# Patient Record
Sex: MALE | Race: WHITE | NOT HISPANIC OR LATINO
[De-identification: names, ages, dates, MRNs, and addresses within clinical notes are randomized per-mention and may not be internally consistent; named-entity substitution may affect disease eponyms.]

---
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
## 2021-02-04 ENCOUNTER — TRANSCRIPTION ENCOUNTER (OUTPATIENT)
Age: 21
End: 2021-02-04

## 2021-02-04 RX ORDER — LEVETIRACETAM 750 MG/1
750 TABLET, EXTENDED RELEASE ORAL
Qty: 90 | Refills: 1 | Status: DISCONTINUED | COMMUNITY
Start: 2017-08-24 | End: 2021-02-04

## 2021-02-17 ENCOUNTER — APPOINTMENT (OUTPATIENT)
Dept: NEUROLOGY | Facility: CLINIC | Age: 21
End: 2021-02-17
Payer: COMMERCIAL

## 2021-02-17 VITALS
DIASTOLIC BLOOD PRESSURE: 74 MMHG | HEIGHT: 68.11 IN | HEART RATE: 58 BPM | BODY MASS INDEX: 21.67 KG/M2 | WEIGHT: 143 LBS | SYSTOLIC BLOOD PRESSURE: 116 MMHG

## 2021-02-17 PROCEDURE — 99072 ADDL SUPL MATRL&STAF TM PHE: CPT

## 2021-02-17 PROCEDURE — 99205 OFFICE O/P NEW HI 60 MIN: CPT

## 2021-02-17 NOTE — ASSESSMENT
[FreeTextEntry1] : We had a long discussion regarding the alternatives for continuing therapy with both Keppra and Aptiom versus removing Keppra and only monotherapy with Aptiom.  The family and the patient are very reluctant to try this since in the past he failed monotherapy.  He also has no side effects and therefore he is a little bit hesitant to come off the Keppra.\par \par Although I agree that in theory monotherapy would be desirable it does not seem to be an attractive alternative to the patient and family.  Therefore we will continue for the time being on the current treatment which seems to be working very well.\par \par I told the family and Nino that at any point in time he is able to reconsider this decision and come off the Keppra if he wishes to.  We certainly discussed the pros and cons of doing this and at the possibilities of remaining seizure-free on only one drug.\par \par I will see him back in 1 year with an EEG

## 2021-02-17 NOTE — PHYSICAL EXAM
[Person] : oriented to person [Place] : oriented to place [Time] : oriented to time [Short Term Intact] : short term memory intact [Span Intact] : the attention span was normal [Concentration Intact] : normal concentrating ability [Visual Intact] : visual attention was ~T not ~L decreased [Naming Objects] : no difficulty naming common objects [Repeating Phrases] : no difficulty repeating a phrase [Writing A Sentence] : no difficulty writing a sentence [Fluency] : fluency intact [Comprehension] : comprehension intact [Reading] : reading intact [Past History] : adequate knowledge of personal past history [Cranial Nerves Optic (II)] : visual acuity intact bilaterally,  visual fields full to confrontation, pupils equal round and reactive to light [Cranial Nerves Oculomotor (III)] : extraocular motion intact [Cranial Nerves Trigeminal (V)] : facial sensation intact symmetrically [Cranial Nerves Facial (VII)] : face symmetrical [Cranial Nerves Vestibulocochlear (VIII)] : hearing was intact bilaterally [Motor Tone] : muscle tone was normal in all four extremities [Motor Strength] : muscle strength was normal in all four extremities [No Muscle Atrophy] : normal bulk in all four extremities [Motor Handedness Right-Handed] : the patient is right hand dominant [Sensation Tactile Decrease] : light touch was intact [Abnormal Walk] : normal gait [Balance] : balance was intact [2+] : Ankle jerk left 2+ [Past-pointing] : there was no past-pointing [Tremor] : no tremor present [Plantar Reflex Right Only] : normal on the right [Plantar Reflex Left Only] : normal on the left

## 2021-02-17 NOTE — DISCUSSION/SUMMARY
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  Genetic studies are also unrevealing

## 2021-02-17 NOTE — HISTORY OF PRESENT ILLNESS
[FreeTextEntry1] : First visit of this [age]-year-old right-handed young man from [place] who is consulting for a history of epilepsy and management\par \par The patient past medical history and seizure history has been documented in the past.\par \par Summary \par Anish  was admitted to [facility] on 8/20/2015. He was at home working on a science project at home that involved connecting electrical wires. He walked down the stairs to tell his mom about the project, but he could not speak any words when he opened his mouth. His jaw was jerking in random directions and he could make some sounds, but no words. He was able to follow all directions such as moving his fingers, pointing, and sitting on the couch, but he was unable to read any \par words. The only words he could say were "I'm ok" and the name of one of his 3 brothers. The week before this event happened he was hit with a baseball in his left jaw (8/14/15). The same day he started to have a gastroenteritis with green colored diarrhea 5-7x a day (one bloody episode), a fever of 102, and back pain.This aphasic episode had happened once before this year on July 11th after he \par read many chapters of biblical verses aloud in summer [place]. The event lasted 10 minutes. Generalized tonic clonic seizure while being evaluated by neurology. Self resolved in <2 minutes. Post seizure, symptoms improved. CT/MRI/LP within [place] limits. [place]. Received 1 dose of Keppra. Pt was admitted for VEEG and observation. EEG and MRI both normal. Keppra was continued.\par \par March 2016\par \par He was event free till last month when he was at the [place] with his family. Anish could not speak very well and was very quiet, his right jaw and face were quivering. He then started to have right neck muscle twitching. There was jacksonian march noted down the right arm and then leg. After Ativan he started talking but was off balance/ blurred vision, had emesis etc.\par \par \par 6/20/16\par \par Had a seizure on May 29, 2016 - in high school in [facility] - was reading - felt his jaw involuntary moving on the right. Took his emergency clonazepam - has a piece of paper that says having a seizure call 911 - school called 911 - seizure didn't progress but couldn't speak so transported to hospital given Ativan - speech returned - all total lasted 30+ minutes. Had an MRI recently at [facility] - mom brought disc, reviewed with neuroradiology no suspicious areas in left temporal region. Trigger again was reading / reciting [employer] texts. He could not get an EEG at the ER in [place] where he was brought in aphasic.\par No headaches. \par \par 8/2016\wilman Oconnell underwent video EEG at [facility] with holding of the [facility]. 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 He was started on OXC as he developed fatigue on LEV limiting dose escalation. So far tolerating well and no further events. Mother has obtained a GPS watch for him and requested a letter describing his diagnosis as he was not given Ativan for a long time in outside ER, reportedly thought aphasia was migraine or post ictal. \par \par 8/2017\par Anish is now on LEV + OXC but much less tired since dose of LEV was lowered. He had been seizure free hence I tried weaning him off the LEV. He had some sensory auras ( that he can not well describe) when LEV dose was dropped to 500 mg. He did not report this to anyone. He stopped LEV and within 48 hours he had right jaw twitch and difficulty reading following the sensation. He was given Versed and episode did not progress. He had three more episodes 2-3 hours after OXC despite increasing the dose of OXC hence two days ago his LEV was restarted at 500 mg. No further seizures since.\par \par Since 2017 on Aptiom and LEV and doing well. Recent visit with Dr. Gonzalez who suggested to maybe consider removing the Keppra since he has been seizure-free and aura free for many years and his Keppra level is extremely low.\par \par He denies any auras or seizures over the last 3 years.  He denies any side effects from the medication.\par \par I reviewed the history again with the mother.  There is no family history of epilepsy or developmental disorders.  There is no family history of neurological problems.\par He is attending [employer] now and doing very well.\par

## 2021-05-03 ENCOUNTER — RX RENEWAL (OUTPATIENT)
Age: 21
End: 2021-05-03

## 2021-08-04 ENCOUNTER — RX RENEWAL (OUTPATIENT)
Age: 21
End: 2021-08-04

## 2021-08-18 ENCOUNTER — NON-APPOINTMENT (OUTPATIENT)
Age: 21
End: 2021-08-18

## 2021-08-18 ENCOUNTER — APPOINTMENT (OUTPATIENT)
Dept: NEUROLOGY | Facility: CLINIC | Age: 21
End: 2021-08-18
Payer: COMMERCIAL

## 2021-08-18 VITALS
HEIGHT: 68 IN | HEART RATE: 66 BPM | BODY MASS INDEX: 21.22 KG/M2 | WEIGHT: 140 LBS | DIASTOLIC BLOOD PRESSURE: 77 MMHG | SYSTOLIC BLOOD PRESSURE: 119 MMHG

## 2021-08-18 PROCEDURE — 99214 OFFICE O/P EST MOD 30 MIN: CPT

## 2021-08-18 NOTE — DISCUSSION/SUMMARY
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  Genetic studies are also unrevealing\par Stable

## 2021-08-18 NOTE — HISTORY OF PRESENT ILLNESS
[FreeTextEntry1] : Follow up\par F/up on this 20-year-old right-handed young man from [place] who is consulting for a history of epilepsy and management\par \par The patient past medical history and seizure history has been documented in the past.\par \par Summary \par Anish  [place] [facility] [place]. He was at home working on a science project at home that involved connecting electrical wires. He walked down the stairs to tell his mom about the project, but he could not speak any words when he opened his mouth. His jaw was jerking in random directions and he could make some sounds, but no words. He was able to follow all directions such as moving his fingers, pointing, and sitting on the couch, but he was unable to read any \par words. The only words he could say were "I'm ok" and the name of one of his 3 brothers. The week before this event happened he was hit with a baseball in his left jaw (8/14/15). The same day he started to have a gastroenteritis with green colored diarrhea 5-7x a day (one bloody episode), a fever of 102, and back pain.This aphasic episode had happened once before this year on July 11th after he \par read many chapters of biblical verses aloud in summer [place]. The event lasted 10 minutes. Generalized tonic clonic seizure while being evaluated by neurology. Self resolved in <2 minutes. Post seizure, symptoms improved. CT/MRI/LP [place] limits. [place]. Received 1 dose of Keppra. Pt was admitted for VEEG and observation. EEG and MRI both normal. Keppra was continued.\par \par March 2016\par \par He was event free till last month when he was at [place] [employer] with his family. Anish could not speak very well and was very quiet, his right jaw and face were quivering. He then started to have right neck muscle twitching. There was jacksonian march noted down the right arm and then leg. After Ativan he started talking but was off balance/ blurred vision, had emesis etc.\par \par \par 6/20/16\par \par Had a seizure on May 29, 2016 - in high school in [facility] - was reading - felt his jaw involuntary moving on the right. Took his emergency clonazepam - has a piece of paper that says having a seizure call 911 - school called 911 - seizure didn't progress but couldn't speak so transported to hospital given Ativan - speech returned - all total lasted 30+ minutes. Had an MRI recently at [facility] - mom brought disc, reviewed with neuroradiology no suspicious areas in left temporal region. Trigger again was reading / reciting Zoroastrianism texts. He could not get an EEG at the ER in [place] where he was brought in aphasic.\par No headaches. \par \par 8/2016leah Oconnell [place] [facility] [place] of the [facility]. 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 He was started on OXC as he developed fatigue on LEV limiting dose escalation. So far tolerating well and no further events. Mother has obtained a GPS watch for him and requested a letter describing his diagnosis as he was not given Ativan for a long time in outside ER, reportedly thought aphasia was migraine or post ictal. \par \par 8/2017\par Anish is now on LEV + OXC but much less tired since dose of LEV was lowered. He had been seizure free hence I tried weaning him off the LEV. He had some sensory auras ( that he can not well describe) when LEV dose was dropped to 500 mg. He did not report this to anyone. He stopped LEV and within 48 hours he had right jaw twitch and difficulty reading following the sensation. He was given Versed and episode did not progress. He had three more episodes 2-3 hours after OXC despite increasing the dose of OXC hence two days ago his LEV was restarted at 500 mg. No further seizures since.\par \par Since 2017 on Aptiom and LEV and doing well. Recent visit with Dr. Gonzalez who suggested to maybe consider removing the Keppra since he has been seizure-free and aura free for many years and his Keppra level is extremely low.\par \par He denies any auras or seizures over the last 3 years.  He denies any side effects from the medication.\par \par I reviewed the history again with the mother.  There is no family history of epilepsy or developmental disorders.  There is no family history of neurological problems.\par He is attending [employer] now and doing very well.\par \par HPI\par No seizures reproted since last seen.\par No side effects\par Going to [employer]\par mood is good\par Wants to drive.\par \par Meds\par Keppra 500 mg\par Aptiom: 200 mg am and 1600 mg qhs \par

## 2021-11-04 RX ORDER — LEVETIRACETAM 250 MG/1
250 TABLET, FILM COATED ORAL
Qty: 30 | Refills: 0 | Status: DISCONTINUED | COMMUNITY
Start: 2021-02-04 | End: 2021-11-04

## 2022-01-31 ENCOUNTER — RX RENEWAL (OUTPATIENT)
Age: 22
End: 2022-01-31

## 2022-05-18 ENCOUNTER — APPOINTMENT (OUTPATIENT)
Dept: NEUROLOGY | Facility: CLINIC | Age: 22
End: 2022-05-18
Payer: COMMERCIAL

## 2022-05-18 VITALS
HEART RATE: 58 BPM | SYSTOLIC BLOOD PRESSURE: 122 MMHG | BODY MASS INDEX: 21.67 KG/M2 | DIASTOLIC BLOOD PRESSURE: 78 MMHG | WEIGHT: 143 LBS | HEIGHT: 68 IN

## 2022-05-18 LAB
ALBUMIN SERPL ELPH-MCNC: 5 G/DL
ALP BLD-CCNC: 62 U/L
ALT SERPL-CCNC: 14 U/L
ANION GAP SERPL CALC-SCNC: 13 MMOL/L
AST SERPL-CCNC: 18 U/L
BASOPHILS # BLD AUTO: 0.03 K/UL
BASOPHILS NFR BLD AUTO: 0.6 %
BILIRUB SERPL-MCNC: 0.4 MG/DL
BUN SERPL-MCNC: 15 MG/DL
CALCIUM SERPL-MCNC: 10 MG/DL
CHLORIDE SERPL-SCNC: 100 MMOL/L
CO2 SERPL-SCNC: 24 MMOL/L
CREAT SERPL-MCNC: 0.96 MG/DL
EGFR: 116 ML/MIN/1.73M2
EOSINOPHIL # BLD AUTO: 0.04 K/UL
EOSINOPHIL NFR BLD AUTO: 0.9 %
GLUCOSE SERPL-MCNC: 97 MG/DL
HCT VFR BLD CALC: 43.6 %
HGB BLD-MCNC: 14.8 G/DL
IMM GRANULOCYTES NFR BLD AUTO: 0.2 %
LYMPHOCYTES # BLD AUTO: 1.16 K/UL
LYMPHOCYTES NFR BLD AUTO: 24.9 %
MAN DIFF?: NORMAL
MCHC RBC-ENTMCNC: 31.7 PG
MCHC RBC-ENTMCNC: 33.9 GM/DL
MCV RBC AUTO: 93.4 FL
MONOCYTES # BLD AUTO: 0.26 K/UL
MONOCYTES NFR BLD AUTO: 5.6 %
NEUTROPHILS # BLD AUTO: 3.16 K/UL
NEUTROPHILS NFR BLD AUTO: 67.8 %
PLATELET # BLD AUTO: 217 K/UL
POTASSIUM SERPL-SCNC: 4.3 MMOL/L
PROT SERPL-MCNC: 7.7 G/DL
RBC # BLD: 4.67 M/UL
RBC # FLD: 11.6 %
SODIUM SERPL-SCNC: 138 MMOL/L
WBC # FLD AUTO: 4.66 K/UL

## 2022-05-18 PROCEDURE — 99214 OFFICE O/P EST MOD 30 MIN: CPT

## 2022-05-18 NOTE — DISCUSSION/SUMMARY
[FreeTextEntry1] : 21-year-old male with focal epilepsy.  Left temporal lobe onset.  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  Genetic studies are also unrevealing\par \par No seizures\par Mild tremor hands

## 2022-05-18 NOTE — HISTORY OF PRESENT ILLNESS
[FreeTextEntry1] : Follow up\par F/up on this 20-year-old right-handed young man from [place] who is consulting for a history of epilepsy and management\par \par The patient past medical history and seizure history has been documented in the past.\par \par Summary \par Anish  was [place] to [facility] [place]20/2015. He was at home working on a science project at home that involved connecting electrical wires. He walked down the stairs to tell his mom about the project, but he could not speak any words when he opened his mouth. His jaw was jerking in random directions and he could make some sounds, but no words. He was able to follow all directions such as moving his fingers, pointing, and sitting on the couch, but he was unable to read any \par words. The only words he could say were "I'm ok" and the name of one of his 3 brothers. The week before this event happened he was hit with a baseball in his left jaw (8/14/15). The same day he started to have a gastroenteritis with green colored diarrhea 5-7x a day (one bloody episode), a fever of 102, and back pain.This aphasic episode had happened once before this year on July 11th after he \par read many chapters of biblical verses aloud in summer [place]. The event lasted 10 minutes. Generalized tonic clonic seizure while being evaluated by neurology. Self resolved in <2 minutes. Post seizure, symptoms improved. CT/MRI/LP [place] limits. [place]. Received 1 dose of Keppra. Pt was admitted for VEEG and observation. EEG and MRI both normal. Keppra was continued.\par \par March 2016\par \par He was event free till last month when he was at the [place] with his family. Anish could not speak very well and was very quiet, his right jaw and face were quivering. He then started to have right neck muscle twitching. There was jacksonian march noted down the right arm and then leg. After Ativan he started talking but was off balance/ blurred vision, had emesis etc.\par \par \par 6/20/16\par \par Had a seizure on May 29, 2016 - in high school in [facility] - was reading - felt his jaw involuntary moving on the right. Took his emergency clonazepam - has a piece of paper that says having a seizure call 911 - school called 911 - seizure didn't progress but couldn't speak so transported to hospital given Ativan - speech returned - all total lasted 30+ minutes. Had an MRI recently at [facility] - mom brought disc, reviewed with neuroradiology no suspicious areas in left temporal region. Trigger again was reading / reciting Christian texts. He could not get an EEG at the ER in [place] where he was brought in aphasic.\par No headaches. \par \par 8/2016leah Oconnell underwent video EEG at [facility] with holding of the AED. 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 He was started on OXC as he developed fatigue on LEV limiting dose escalation. So far tolerating well and no further events. Mother has obtained a GPS watch for him and requested a letter describing his diagnosis as he was not given Ativan for a long time in outside ER, reportedly thought aphasia was migraine or post ictal. \par \par 8/2017\par Anish is now on LEV + OXC but much less tired since dose of LEV was lowered. He had been seizure free hence I tried weaning him off the LEV. He had some sensory auras ( that he can not well describe) when LEV dose was dropped to 500 mg. He did not report this to anyone. He stopped LEV and within 48 hours he had right jaw twitch and difficulty reading following the sensation. He was given Versed and episode did not progress. He had three more episodes 2-3 hours after OXC despite increasing the dose of OXC hence two days ago his LEV was restarted at 500 mg. No further seizures since.\par \par Since 2017 on Aptiom and LEV and doing well. Recent visit with Dr. Gonzalez who suggested to maybe consider removing the Keppra since he has been seizure-free and aura free for many years and his Keppra level is extremely low.\par \par He denies any auras or seizures over the last 3 years.  He denies any side effects from the medication.\par \par I reviewed the history again with the mother.  There is no family history of epilepsy or developmental disorders.  There is no family history of neurological problems.\par [facility] is [facility] and doing very well.\par \par HPI   5/18/2022\par No seizures reported since last seen.\par No side effects\par Going to [employer]\par mood is good\par Driving\par \par Meds\par Keppra 750 ER mg\par Aptiom: 200 mg am and 1600 mg qhs \par

## 2022-05-18 NOTE — PHYSICAL EXAM
[Person] : oriented to person [Place] : oriented to place [Time] : oriented to time [Short Term Intact] : short term memory intact [Span Intact] : the attention span was normal [Concentration Intact] : normal concentrating ability [Visual Intact] : visual attention was ~T not ~L decreased [Naming Objects] : no difficulty naming common objects [Repeating Phrases] : no difficulty repeating a phrase [Writing A Sentence] : no difficulty writing a sentence [Fluency] : fluency intact [Comprehension] : comprehension intact [Reading] : reading intact [Past History] : adequate knowledge of personal past history [Cranial Nerves Optic (II)] : visual acuity intact bilaterally,  visual fields full to confrontation, pupils equal round and reactive to light [Cranial Nerves Oculomotor (III)] : extraocular motion intact [Cranial Nerves Trigeminal (V)] : facial sensation intact symmetrically [Cranial Nerves Facial (VII)] : face symmetrical [Cranial Nerves Vestibulocochlear (VIII)] : hearing was intact bilaterally [Motor Tone] : muscle tone was normal in all four extremities [Motor Strength] : muscle strength was normal in all four extremities [No Muscle Atrophy] : normal bulk in all four extremities [Motor Handedness Right-Handed] : the patient is right hand dominant [Sensation Tactile Decrease] : light touch was intact [Abnormal Walk] : normal gait [Balance] : balance was intact [Past-pointing] : there was no past-pointing [Tremor] : no tremor present [2+] : Ankle jerk left 2+ [Plantar Reflex Right Only] : normal on the right [Plantar Reflex Left Only] : normal on the left

## 2022-05-18 NOTE — ASSESSMENT
[FreeTextEntry1] : DC AM dose aptiom. Continue dose of night doses and Keppra\par Labs \par RTC 1 year

## 2022-05-23 LAB — LEVETIRACETAM SERPL-MCNC: 5.8 UG/ML

## 2022-09-01 ENCOUNTER — APPOINTMENT (OUTPATIENT)
Dept: NEUROLOGY | Facility: CLINIC | Age: 22
End: 2022-09-01

## 2022-09-01 VITALS
SYSTOLIC BLOOD PRESSURE: 114 MMHG | HEART RATE: 65 BPM | WEIGHT: 143 LBS | DIASTOLIC BLOOD PRESSURE: 75 MMHG | TEMPERATURE: 97.7 F | BODY MASS INDEX: 21.67 KG/M2 | HEIGHT: 68 IN | OXYGEN SATURATION: 97 %

## 2022-09-01 PROCEDURE — 99214 OFFICE O/P EST MOD 30 MIN: CPT

## 2022-09-01 NOTE — HISTORY OF PRESENT ILLNESS
[FreeTextEntry1] : Follow up\par F/up on this 21-year-old right-handed young man from [place] who is consulting for a history of epilepsy and management\par \par The patient past medical history and seizure history has been documented in the past.\par \par Summary \par Anish  was admitted to [facility] on 8/20/2015. He was at home working on a science project at home that involved connecting electrical wires. He walked down the stairs to tell his mom about the project, but he could not speak any words when he opened his mouth. His jaw was jerking in random directions and he could make some sounds, but no words. He was able to follow all directions such as moving his fingers, pointing, and sitting on the couch, but he was unable to read any \par words. The only words he could say were "I'm ok" and the name of one of his 3 brothers. The week before this event happened he was hit with a baseball in his left jaw (8/14/15). The same day he started to have a gastroenteritis with green colored diarrhea 5-7x a day (one bloody episode), a fever of 102, and back pain.This aphasic episode had happened once before this year on July 11th after he \par read many chapters of biblical verses aloud in summer [place]. The event lasted 10 minutes. Generalized tonic clonic seizure while being evaluated by neurology. Self resolved in <2 minutes. Post seizure, symptoms improved. CT/MRI/LP within normal limits. A [place] encephalitis [place]. Received 1 dose of Keppra. Pt was admitted for VEEG and observation. EEG and MRI both normal. Keppra was continued.\par \par March 2016\par \par He was event free till last month [place]. Anish could not speak very well and was very quiet, his right jaw and face were quivering. He then started to have right neck muscle twitching. There was jacksonian march noted down the right arm and then leg. After Ativan he started talking but was off balance/ blurred vision, had emesis etc.\par \par \par 6/20/16\par \par Had a seizure on May 29, 2016 - in high school in [facility] - was reading - felt his jaw involuntary moving on the right. Took his emergency clonazepam - has a piece of paper that says having a seizure call 911 - school called 911 - seizure didn't progress but couldn't speak so transported to hospital given Ativan - speech returned - all total lasted 30+ minutes. Had an MRI recently at [facility] - mom brought disc, reviewed with neuroradiology no suspicious areas in left temporal region. Trigger again was reading / reciting Sikhism texts. He could not get an EEG at the ER in [place] where he was brought in aphasic.\par No headaches. \par \par 8/2016leah Oconnell underwent video EEG at [facility] with holding of the AED. 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 He was started on OXC as he developed fatigue on LEV limiting dose escalation. So far tolerating well and no further events. Mother has obtained a GPS watch for him and requested a letter describing his diagnosis as he was not given Ativan for a long time in outside ER, reportedly thought aphasia was migraine or post ictal. \par \par 8/2017\par Anish is now on LEV + OXC but much less tired since dose of LEV was lowered. He had been seizure free hence I tried weaning him off the LEV. He had some sensory auras ( that he can not well describe) when LEV dose was dropped to 500 mg. He did not report this to anyone. He stopped LEV and within 48 hours he had right jaw twitch and difficulty reading following the sensation. He was given Versed and episode did not progress. He had three more episodes 2-3 hours after OXC despite increasing the dose of OXC hence two days ago his LEV was restarted at 500 mg. No further seizures since.\par \par Since 2017 on Aptiom and LEV and doing well. Recent visit with Dr. Gonzalez who suggested to maybe consider removing the Keppra since he has been seizure-free and aura free for many years and his Keppra level is extremely low.\par \par He denies any auras or seizures over the last 3 years.  He denies any side effects from the medication.\par \par I reviewed the history again with the mother.  There is no family history of epilepsy or developmental disorders.  There is no family history of neurological problems.\par He is attending [facility] [employer] now and doing very well.\par \par \par \par HPI     9/1/2022\par \par Going to [facility] in summer\par Doing well\par No medical issues\par Doing well \par \par Meds\par Keppra 750 ER mg\par Aptiom: 1600 mg qhs \par \par HPI   5/18/2022\par No seizures reported since last seen.\par No side effects\par Going to [facility]\par mood is good\par Driving\par \par Meds\par Keppra 750 ER mg\par Aptiom: 1600 mg qhs \par

## 2022-09-01 NOTE — DISCUSSION/SUMMARY
[FreeTextEntry1] : 21 year-old male with focal epilepsy.  Left temporal lobe onset.  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  Genetic studies are also unrevealing\par Stable

## 2023-09-26 ENCOUNTER — APPOINTMENT (OUTPATIENT)
Dept: NEUROLOGY | Facility: CLINIC | Age: 23
End: 2023-09-26
Payer: COMMERCIAL

## 2023-09-26 VITALS
BODY MASS INDEX: 22.43 KG/M2 | SYSTOLIC BLOOD PRESSURE: 127 MMHG | HEART RATE: 60 BPM | DIASTOLIC BLOOD PRESSURE: 79 MMHG | OXYGEN SATURATION: 99 % | WEIGHT: 148 LBS | HEIGHT: 68 IN | TEMPERATURE: 98 F

## 2023-09-26 PROCEDURE — 99214 OFFICE O/P EST MOD 30 MIN: CPT

## 2023-10-23 ENCOUNTER — NON-APPOINTMENT (OUTPATIENT)
Age: 23
End: 2023-10-23

## 2023-10-24 RX ORDER — MIDAZOLAM 5 MG/.1ML
5 SPRAY NASAL
Qty: 2 | Refills: 0 | Status: ACTIVE | COMMUNITY
Start: 2020-06-12 | End: 1900-01-01

## 2023-12-01 RX ORDER — ESLICARBAZEPINE ACETATE 800 MG/1
800 TABLET ORAL
Qty: 180 | Refills: 2 | Status: ACTIVE | COMMUNITY
Start: 2019-03-14 | End: 1900-01-01

## 2023-12-12 RX ORDER — LEVETIRACETAM 750 MG/1
750 TABLET, EXTENDED RELEASE ORAL
Qty: 90 | Refills: 3 | Status: ACTIVE | COMMUNITY
Start: 2021-05-03 | End: 1900-01-01

## 2024-01-02 ENCOUNTER — RX RENEWAL (OUTPATIENT)
Age: 24
End: 2024-01-02

## 2024-02-16 ENCOUNTER — NON-APPOINTMENT (OUTPATIENT)
Age: 24
End: 2024-02-16

## 2024-04-09 ENCOUNTER — APPOINTMENT (OUTPATIENT)
Dept: NEUROLOGY | Facility: CLINIC | Age: 24
End: 2024-04-09
Payer: COMMERCIAL

## 2024-04-09 VITALS
HEIGHT: 68 IN | SYSTOLIC BLOOD PRESSURE: 134 MMHG | BODY MASS INDEX: 23.34 KG/M2 | TEMPERATURE: 98.1 F | DIASTOLIC BLOOD PRESSURE: 77 MMHG | WEIGHT: 154 LBS | OXYGEN SATURATION: 99 % | HEART RATE: 69 BPM

## 2024-04-09 DIAGNOSIS — G40.109 LOCALIZATION-RELATED (FOCAL) (PARTIAL) SYMPTOMATIC EPILEPSY AND EPILEPTIC SYNDROMES WITH SIMPLE PARTIAL SEIZURES, NOT INTRACTABLE, W/OUT STATUS EPILEPTICUS: ICD-10-CM

## 2024-04-09 PROCEDURE — G2211 COMPLEX E/M VISIT ADD ON: CPT

## 2024-04-09 PROCEDURE — 99214 OFFICE O/P EST MOD 30 MIN: CPT

## 2024-04-09 RX ORDER — LEVETIRACETAM 750 MG/1
750 TABLET, FILM COATED, EXTENDED RELEASE ORAL
Refills: 0 | Status: ACTIVE | COMMUNITY

## 2024-04-09 RX ORDER — LEVETIRACETAM 250 MG/1
250 TABLET, FILM COATED ORAL
Qty: 30 | Refills: 0 | Status: DISCONTINUED | COMMUNITY
Start: 2023-09-29 | End: 2024-04-09

## 2024-04-09 RX ORDER — ESLICARBAZEPINE ACETATE 200 MG/1
200 TABLET ORAL
Qty: 90 | Refills: 2 | Status: DISCONTINUED | COMMUNITY
Start: 2019-04-29 | End: 2024-04-09

## 2024-04-09 NOTE — HISTORY OF PRESENT ILLNESS
[FreeTextEntry1] : Follow up F/up on this 23-year-old right-handed young man from [place] who is consulting for a history of epilepsy and management  The patient past medical history and seizure history has been documented in the past.  Summary  Anish  was admitted to [facility] [place]/2015. He was at home working on a science project at home that involved connecting electrical wires. He walked down the stairs to tell his mom about the project, but he could not speak any words when he opened his mouth. His jaw was jerking in random directions and he could make some sounds, but no words. He was able to follow all directions such as moving his fingers, pointing, and sitting on the couch, but he was unable to read any  words. The only words he could say were "I'm ok" and the name of one of his 3 brothers. The week before this event happened he was hit with a baseball in his left jaw (8/14/15). The same day he started to have a gastroenteritis with green colored diarrhea 5-7x a day (one bloody episode), a fever of 102, and back pain.This aphasic episode had happened once before this year on July 11th after he  read many chapters of biblical verses aloud in summer [place]. The event lasted 10 minutes. Generalized tonic clonic seizure while being evaluated by neurology. Self resolved in <2 minutes. Post seizure, symptoms improved. CT/MRI/LP within normal limits. A [facility] encephalitis panel was sent. Received 1 dose of Keppra. Pt was admitted for VEEG and observation. EEG and MRI both normal. Keppra was continued.  March 2016  He was event free till last month when he was at the [place] with his family. Anish could not speak very well and was very quiet, his right jaw and face were quivering. He then started to have right neck muscle twitching. There was jacksonian march noted down the right arm and then leg. After Ativan he started talking but was off balance/ blurred vision, had emesis etc.   6/20/16  Had a seizure on May 29, 2016 - in high school in [facility] - was reading - felt his jaw involuntary moving on the right. Took his emergency clonazepam - has a piece of paper that says having a seizure call 911 - school called 911 - seizure didn't progress but couldn't speak so transported to hospital given Ativan - speech returned - all total lasted 30+ minutes. Had an MRI recently at [facility] - mom brought disc, reviewed with neuroradiology no suspicious areas in left temporal region. Trigger again was reading / reciting Latter-day texts. He could not get an EEG at the ER in [place] where he was brought in aphasic. No headaches.   8/2016 Anish underwent video EEG at [facility] with holding of the AED. 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 He was started on OXC as he developed fatigue on LEV limiting dose escalation. So far tolerating well and no further events. Mother has obtained a GPS watch for him and requested a letter describing his diagnosis as he was not given Ativan for a long time in outside ER, reportedly thought aphasia was migraine or post ictal.   8/2017 Anish is now on LEV + OXC but much less tired since dose of LEV was lowered. He had been seizure free hence I tried weaning him off the LEV. He had some sensory auras ( that he can not well describe) when LEV dose was dropped to 500 mg. He did not report this to anyone. He stopped LEV and within 48 hours he had right jaw twitch and difficulty reading following the sensation. He was given Versed and episode did not progress. He had three more episodes 2-3 hours after OXC despite increasing the dose of OXC hence two days ago his LEV was restarted at 500 mg. No further seizures since.  Since 2017 on Aptiom and LEV and doing well. Recent visit with Dr. Gonzalez who suggested to maybe consider removing the Keppra since he has been seizure-free and aura free for many years and his Keppra level is extremely low.  He denies any auras or seizures over the last 3 years.  He denies any side effects from the medication.  I reviewed the history again with the mother.  There is no family history of epilepsy or developmental disorders.  There is no family history of neurological problems. 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  HPI    4/9/2024  Got off the Keppra and then aura. Then started again Keppra 750 ER and had not had any other auras. No side effects No issues   9/26/23 HPI: No seizures on current meds. Has had a few auras, but all related to missing a dose of both his meds. Notes he is usually very compliant.  Meds Keppra 750 ER mg Aptiom: 1600 mg qhs  Interested in coming off of Keppra.  HPI     9/1/2022  Going to [facility] in summer Doing well No medical issues Doing well   Meds Keppra 750 ER mg Aptiom: 1600 mg qhs   HPI   5/18/2022 No seizures reported since last seen. No side effects Going to [facility] mood is good Driving  Meds Keppra 750 ER mg Aptiom: 1600 mg qhs

## 2024-04-12 LAB — LEVETIRACETAM SERPL-MCNC: 6.4 UG/ML

## 2024-04-15 LAB — OXCARBAZEPINE SERPL-MCNC: 25 UG/ML

## 2024-04-16 ENCOUNTER — APPOINTMENT (OUTPATIENT)
Dept: NEUROLOGY | Facility: CLINIC | Age: 24
End: 2024-04-16
Payer: COMMERCIAL

## 2024-04-16 PROCEDURE — 95816 EEG AWAKE AND DROWSY: CPT

## 2024-06-25 NOTE — REASON FOR VISIT
[Follow-Up Evaluation] : a follow-up evaluation for [Seizure Disorder] : seizure disorder [Patient] : patient [Mother] : mother None

## 2024-07-19 ENCOUNTER — TRANSCRIPTION ENCOUNTER (OUTPATIENT)
Age: 24
End: 2024-07-19

## 2024-07-29 ENCOUNTER — NON-APPOINTMENT (OUTPATIENT)
Age: 24
End: 2024-07-29

## 2024-07-31 DIAGNOSIS — G43.719 CHRONIC MIGRAINE W/OUT AURA, INTRACTABLE, W/OUT STATUS MIGRAINOSUS: ICD-10-CM

## 2024-07-31 RX ORDER — RIZATRIPTAN BENZOATE 10 MG/1
10 TABLET ORAL
Qty: 9 | Refills: 1 | Status: ACTIVE | COMMUNITY
Start: 2024-07-31 | End: 1900-01-01

## 2024-08-06 ENCOUNTER — TRANSCRIPTION ENCOUNTER (OUTPATIENT)
Age: 24
End: 2024-08-06

## 2024-08-13 ENCOUNTER — APPOINTMENT (OUTPATIENT)
Dept: NEUROLOGY | Facility: CLINIC | Age: 24
End: 2024-08-13
Payer: COMMERCIAL

## 2024-08-13 VITALS
HEIGHT: 69 IN | DIASTOLIC BLOOD PRESSURE: 75 MMHG | HEART RATE: 64 BPM | OXYGEN SATURATION: 99 % | TEMPERATURE: 96.4 F | WEIGHT: 141 LBS | SYSTOLIC BLOOD PRESSURE: 112 MMHG | BODY MASS INDEX: 20.88 KG/M2

## 2024-08-13 DIAGNOSIS — G40.109 LOCALIZATION-RELATED (FOCAL) (PARTIAL) SYMPTOMATIC EPILEPSY AND EPILEPTIC SYNDROMES WITH SIMPLE PARTIAL SEIZURES, NOT INTRACTABLE, W/OUT STATUS EPILEPTICUS: ICD-10-CM

## 2024-08-13 DIAGNOSIS — R79.89 OTHER SPECIFIED ABNORMAL FINDINGS OF BLOOD CHEMISTRY: ICD-10-CM

## 2024-08-13 DIAGNOSIS — G43.719 CHRONIC MIGRAINE W/OUT AURA, INTRACTABLE, W/OUT STATUS MIGRAINOSUS: ICD-10-CM

## 2024-08-13 PROCEDURE — 99215 OFFICE O/P EST HI 40 MIN: CPT

## 2024-08-13 PROCEDURE — G2211 COMPLEX E/M VISIT ADD ON: CPT | Mod: NC

## 2024-08-13 RX ORDER — METHYLPREDNISOLONE 4 MG/1
4 TABLET ORAL
Qty: 1 | Refills: 0 | Status: ACTIVE | COMMUNITY
Start: 2024-08-13 | End: 1900-01-01

## 2024-08-13 RX ORDER — ERGOCALCIFEROL 1.25 MG/1
50000 CAPSULE ORAL
Qty: 5 | Refills: 0 | Status: ACTIVE | COMMUNITY
Start: 2024-08-13 | End: 1900-01-01

## 2024-08-14 ENCOUNTER — APPOINTMENT (OUTPATIENT)
Dept: INTERNAL MEDICINE | Facility: CLINIC | Age: 24
End: 2024-08-14

## 2024-08-20 ENCOUNTER — APPOINTMENT (OUTPATIENT)
Dept: NEUROLOGY | Facility: CLINIC | Age: 24
End: 2024-08-20
Payer: COMMERCIAL

## 2024-08-20 VITALS
WEIGHT: 141 LBS | HEIGHT: 69 IN | HEART RATE: 60 BPM | OXYGEN SATURATION: 99 % | TEMPERATURE: 97.5 F | BODY MASS INDEX: 20.88 KG/M2 | SYSTOLIC BLOOD PRESSURE: 127 MMHG | DIASTOLIC BLOOD PRESSURE: 80 MMHG

## 2024-08-20 PROCEDURE — 99214 OFFICE O/P EST MOD 30 MIN: CPT

## 2024-08-20 PROCEDURE — G2211 COMPLEX E/M VISIT ADD ON: CPT | Mod: NC

## 2024-08-20 NOTE — HISTORY OF PRESENT ILLNESS
[FreeTextEntry1] : Follow up F/up on this 23-year-old right-handed young man from [place] who is consulting for a history of epilepsy and management  The patient past medical history and seizure history has been documented in the past.  Summary  Anish  was admitted to [facility] on 8/20/2015. He was at home working on a science project at home that involved connecting electrical wires. He walked down the stairs to tell his mom about the project, but he could not speak any words when he opened his mouth. His jaw was jerking in random directions and he could make some sounds, but no words. He was able to follow all directions such as moving his fingers, pointing, and sitting on the couch, but he was unable to read any  words. The only words he could say were "I'm ok" and the name of one of his 3 brothers. The week before this event happened he was hit with a baseball in his left jaw (8/14/15). The same day he started to have a gastroenteritis with green colored diarrhea 5-7x a day (one bloody episode), a fever of 102, and back pain.This aphasic episode had happened once before this year on July 11th after he  read many chapters of biblical verses aloud in [place]. The event lasted 10 minutes. Generalized tonic clonic seizure while being evaluated by neurology. Self resolved in <2 minutes. Post seizure, symptoms improved. CT/MRI/LP within normal limits. A [facility] encephalitis panel was sent. Received 1 dose of Keppra. Pt was admitted for VEEG and observation. EEG and MRI both normal. Keppra was continued.  March 2016  He was event free till last month when he was at the [place] with his family. Anish could not speak very well and was very quiet, his right jaw and face were quivering. He then started to have right neck muscle twitching. There was jacksonian march noted down the right arm and then leg. After Ativan he started talking but was off balance/ blurred vision, had emesis etc.   6/20/16  Had a seizure on May 29, 2016 - in high school in [facility] - was reading - felt his jaw involuntary moving on the right. Took his emergency clonazepam - has a piece of paper that says having a seizure call 911 - school called 911 - seizure didn't progress but couldn't speak so transported to hospital given Ativan - speech returned - all total lasted 30+ minutes. Had an MRI recently at [facility] - mom brought disc, reviewed with neuroradiology no suspicious areas in left temporal region. Trigger again was reading / reciting Sabianism texts. He could not get an EEG at the ER in [place] where he was brought in aphasic. No headaches.   8/2016 Anish underwent video EEG at [facility] with holding of the AED. 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 He was started on OXC as he developed fatigue on LEV limiting dose escalation. So far tolerating well and no further events. Mother has obtained a GPS watch for him and requested a letter describing his diagnosis as he was not given Ativan for a long time in outside ER, reportedly thought aphasia was migraine or post ictal.   8/2017 Anish is now on LEV + OXC but much less tired since dose of LEV was lowered. He had been seizure free hence I tried weaning him off the LEV. He had some sensory auras ( that he can not well describe) when LEV dose was dropped to 500 mg. He did not report this to anyone. He stopped LEV and within 48 hours he had right jaw twitch and difficulty reading following the sensation. He was given Versed and episode did not progress. He had three more episodes 2-3 hours after OXC despite increasing the dose of OXC hence two days ago his LEV was restarted at 500 mg. No further seizures since.  Since 2017 on Aptiom and LEV and doing well. Recent visit with Dr. Gonzalez who suggested to maybe consider removing the Keppra since he has been seizure-free and aura free for many years and his Keppra level is extremely low.  He denies any auras or seizures over the last 3 years.  He denies any side effects from the medication.  I reviewed the history again with the mother.  There is no family history of epilepsy or developmental disorders.  There is no family history of neurological problems. 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  HPI     8/20/24  Anish didn't respond to steroids pack. Headaches same. Mild to mod 5/10 No migraine symptoms Unable to concentrate No nausea vomiting.  He is sleeping okay.  He is getting  about 2 months.  HPI    8/13/2024  No further auras after he went back on Keppra No side effects  Main problem is Headaches x several weeks. OTC doesnt help and ergots neither.  Labs show low vitamin D levels. Vit D 26   Meds Keppra 750 ER mg Aptiom: 1600 mg qhs    HPI    4/9/2024  Got off the Keppra and then aura. Then started again Keppra 750 ER and had not had any other auras. No side effects No issues   9/26/23 HPI: No seizures on current meds. Has had a few auras, but all related to missing a dose of both his meds. Notes he is usually very compliant.  Meds Keppra 750 ER mg Aptiom: 1600 mg qhs  Interested in coming off of Keppra.  HPI     9/1/2022  Going to [facility] in summer Doing well No medical issues Doing well   Meds Keppra 750 ER mg Aptiom: 1600 mg qhs   HPI   5/18/2022 No seizures reported since last seen. No side effects Going to [facility] mood is good Driving  Meds Keppra 750 ER mg Aptiom: 1600 mg qhs

## 2024-08-20 NOTE — REVIEW OF SYSTEMS
[Feeling Tired] : feeling tired [Tension Headache] : tension-type headaches [Negative] : Respiratory [As Noted in HPI] : as noted in HPI

## 2024-08-20 NOTE — HISTORY OF PRESENT ILLNESS
[FreeTextEntry1] : Follow up F/up on this 23-year-old right-handed young man from [place] who is consulting for a history of epilepsy and management  The patient past medical history and seizure history has been documented in the past.  Summary  Anish  was admitted to [facility] on 8/20/2015. He was at home working on a science project at home that involved connecting electrical wires. He walked down the stairs to tell his mom about the project, but he could not speak any words when he opened his mouth. His jaw was jerking in random directions and he could make some sounds, but no words. He was able to follow all directions such as moving his fingers, pointing, and sitting on the couch, but he was unable to read any  words. The only words he could say were "I'm ok" and the name of one of his 3 brothers. The week before this event happened he was hit with a baseball in his left jaw (8/14/15). The same day he started to have a gastroenteritis with green colored diarrhea 5-7x a day (one bloody episode), a fever of 102, and back pain.This aphasic episode had happened once before this year on July 11th after he  read many chapters of biblical verses aloud in summer [place]. The event lasted 10 minutes. Generalized tonic clonic seizure while being evaluated by neurology. Self resolved in <2 minutes. Post seizure, symptoms improved. CT/MRI/LP within normal limits. A [facility] encephalitis panel was sent. Received 1 dose of Keppra. Pt was admitted for VEEG and observation. EEG and MRI both normal. Keppra was continued.  March 2016  He was event free till last month when he was at the [place] with his family. Anish could not speak very well and was very quiet, his right jaw and face were quivering. He then started to have right neck muscle twitching. There was jacksonian march noted down the right arm and then leg. After Ativan he started talking but was off balance/ blurred vision, had emesis etc.   6/20/16  Had a seizure on May 29, 2016 - in high school in [facility] - was reading - felt his jaw involuntary moving on the right. Took his emergency clonazepam - has a piece of paper that says having a seizure call 911 - school called 911 - seizure didn't progress but couldn't speak so transported to hospital given Ativan - speech returned - all total lasted 30+ minutes. Had an MRI recently at [facility] - mom brought disc, reviewed with neuroradiology no suspicious areas in left temporal region. Trigger again was reading / reciting Protestant texts. He could not get an EEG at the ER in [place] where he was brought in aphasic. No headaches.   8/2016 Anish underwent video EEG at [facility] with holding of the AED. 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 He was started on OXC as he developed fatigue on LEV limiting dose escalation. So far tolerating well and no further events. Mother has obtained a GPS watch for him and requested a letter describing his diagnosis as he was not given Ativan for a long time in outside ER, reportedly thought aphasia was migraine or post ictal.   8/2017 Anish is now on LEV + OXC but much less tired since dose of LEV was lowered. He had been seizure free hence I tried weaning him off the LEV. He had some sensory auras ( that he can not well describe) when LEV dose was dropped to 500 mg. He did not report this to anyone. He stopped LEV and within 48 hours he had right jaw twitch and difficulty reading following the sensation. He was given Versed and episode did not progress. He had three more episodes 2-3 hours after OXC despite increasing the dose of OXC hence two days ago his LEV was restarted at 500 mg. No further seizures since.  Since 2017 on Aptiom and LEV and doing well. Recent visit with Dr. Gonzalez who suggested to maybe consider removing the Keppra since he has been seizure-free and aura free for many years and his Keppra level is extremely low.  He denies any auras or seizures over the last 3 years.  He denies any side effects from the medication.  I reviewed the history again with the mother.  There is no family history of epilepsy or developmental disorders.  There is no family history of neurological problems. 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  HPI     8/20/24  Anish didnt respond to steroids pack. Headaches same. Mild to mod 5/10 No migraine symptoms Unable to concentrate No nausea vomiting.  He is sleeping okay.  He is getting  about 2 months.  HPI    8/13/2024  No further auras after he went back on Keppra No side effects  Main problem is Headaches x several weeks. OTC doesnt help and ergots neither.  Labs show low vitamin D levels. Vit D 26   Meds Keppra 750 ER mg Aptiom: 1600 mg qhs    HPI    4/9/2024  Got off the Keppra and then aura. Then started again Keppra 750 ER and had not had any other auras. No side effects No issues   9/26/23 HPI: No seizures on current meds. Has had a few auras, but all related to missing a dose of both his meds. Notes he is usually very compliant.  Meds Keppra 750 ER mg Aptiom: 1600 mg qhs  Interested in coming off of Keppra.  HPI     9/1/2022  Going to [facility] in summer Doing well No medical issues Doing well   Meds Keppra 750 ER mg Aptiom: 1600 mg qhs   HPI   5/18/2022 No seizures reported since last seen. No side effects Going to [facility] mood is good Driving  Meds Keppra 750 ER mg Aptiom: 1600 mg qhs

## 2024-08-20 NOTE — DISCUSSION/SUMMARY
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 The patient has been seizure-free and aura free for several years on a combination of Aptiom and Keppra Imaging studies have been reported to be normal Neurologic examination is also normal and there is a negative family history of epilepsy.  Genetic studies are also unrevealing Stable Mixed HA's Low Vit D

## 2024-08-20 NOTE — ASSESSMENT
[FreeTextEntry1] : Continue same AEDs doses Brain MRI Trial of gepant drug. If not better may need codeine  Labs today

## 2024-08-20 NOTE — HISTORY OF PRESENT ILLNESS
[FreeTextEntry1] : Follow up F/up on this 23-year-old right-handed young man from [place] who is consulting for a history of epilepsy and management  The patient past medical history and seizure history has been documented in the past.  Summary  [place]ish  [place] [facility] [place]. He was at home working on a science project at home that involved connecting electrical wires. He walked down the stairs to tell his mom about the project, but he could not speak any words when he opened his mouth. His jaw was jerking in random directions and he could make some sounds, but no words. He was able to follow all directions such as moving his fingers, pointing, and sitting on the couch, but he was unable to read any  words. The only words he could say were "I'm ok" and the name of one of his 3 brothers. The week before this event happened he was hit with a baseball in his left jaw (8/14/15). The same day he started to have a gastroenteritis with green colored diarrhea 5-7x a day (one bloody episode), a fever of 102, and back pain.This aphasic episode had happened once before this year on July 11th after he  read many chapters of biblical verses aloud in summer [place]. The event lasted 10 minutes. Generalized tonic clonic seizure while being evaluated by neurology. Self resolved in <2 minutes. Post seizure, symptoms improved. CT/MRI/LP within normal limits. A [facility] encephalitis panel was sent. Received 1 dose of Keppra. Pt was admitted for VEEG and observation. EEG and MRI both normal. Keppra was continued.  March 2016  He was event free till last month when he was at the [place] with his family. Anish could not speak very well and was very quiet, his right jaw and face were quivering. He then started to have right neck muscle twitching. There was jacksonian march noted down the right arm and then leg. After Ativan he started talking but was off balance/ blurred vision, had emesis etc.   6/20/16  Had a seizure on May 29, 2016 - in high school in [facility] - was reading - felt his jaw involuntary moving on the right. Took his emergency clonazepam - has a piece of paper that says having a seizure call 911 - school called 911 - seizure didn't progress but couldn't speak so transported to hospital given Ativan - speech returned - all total lasted 30+ minutes. Had an MRI recently at [facility] - mom brought disc, reviewed with neuroradiology no suspicious areas in left temporal region. Trigger again was reading / reciting Mandaeism texts. He could not get an EEG at the ER in [place] where he was brought in aphasic. No headaches.   8/2016 [place]ish [place] [facility] [place] [facility]. 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 He was started on OXC as he developed fatigue on LEV limiting dose escalation. So far tolerating well and no further events. Mother has obtained a GPS watch for him and requested a letter describing his diagnosis as he was not given Ativan for a long time in outside ER, reportedly thought aphasia was migraine or post ictal.   8/2017 Anish is now on LEV + OXC but much less tired since dose of LEV was lowered. He had been seizure free hence I tried weaning him off the LEV. He had some sensory auras ( that he can not well describe) when LEV dose was dropped to 500 mg. He did not report this to anyone. He stopped LEV and within 48 hours he had right jaw twitch and difficulty reading following the sensation. He was given Versed and episode did not progress. He had three more episodes 2-3 hours after OXC despite increasing the dose of OXC hence two days ago his LEV was restarted at 500 mg. No further seizures since.  Since 2017 on Aptiom and LEV and doing well. Recent visit with Dr. Gonzalez who suggested to maybe consider removing the Keppra since he has been seizure-free and aura free for many years and his Keppra level is extremely low.  He denies any auras or seizures over the last 3 years.  He denies any side effects from the medication.  I reviewed the history again with the mother.  There is no family history of epilepsy or developmental disorders.  There is no family history of neurological problems. 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  HPI     8/20/24  Anish didnt respond to steroids pack. Headaches same. Mild to mod 5/10 No migraine symptoms Unable to concentrate No nausea vomiting.  He is sleeping okay.  He is getting  about 2 months.  HPI    8/13/2024  No further auras after he went back on Keppra No side effects  Main problem is Headaches x several weeks. OTC doesnt help and ergots neither.  Labs show low vitamin D levels. Vit D 26   Meds Keppra 750 ER mg Aptiom: 1600 mg qhs    HPI    4/9/2024  Got off the Keppra and then aura. Then started again Keppra 750 ER and had not had any other auras. No side effects No issues   9/26/23 HPI: No seizures on current meds. Has had a few auras, but all related to missing a dose of both his meds. Notes he is usually very compliant.  Meds Keppra 750 ER mg Aptiom: 1600 mg qhs  Interested in coming off of Keppra.  HPI     9/1/2022  Going to [facility] in summer Doing well No medical issues Doing well   Meds Keppra 750 ER mg Aptiom: 1600 mg qhs   HPI   5/18/2022 No seizures reported since last seen. No side effects Going to [facility] mood is good Driving  Meds Keppra 750 ER mg Aptiom: 1600 mg qhs

## 2024-08-21 LAB
ALBUMIN SERPL ELPH-MCNC: 4.9 G/DL
ALP BLD-CCNC: 55 U/L
ALT SERPL-CCNC: 11 U/L
ANION GAP SERPL CALC-SCNC: 12 MMOL/L
AST SERPL-CCNC: 16 U/L
BILIRUB SERPL-MCNC: 0.3 MG/DL
BUN SERPL-MCNC: 13 MG/DL
CALCIUM SERPL-MCNC: 9.8 MG/DL
CHLORIDE SERPL-SCNC: 103 MMOL/L
CO2 SERPL-SCNC: 25 MMOL/L
CREAT SERPL-MCNC: 0.99 MG/DL
CRP SERPL-MCNC: <3 MG/L
EGFR: 110 ML/MIN/1.73M2
ERYTHROCYTE [SEDIMENTATION RATE] IN BLOOD BY WESTERGREN METHOD: 3 MM/HR
GLUCOSE SERPL-MCNC: 102 MG/DL
POTASSIUM SERPL-SCNC: 4.1 MMOL/L
PROT SERPL-MCNC: 7.7 G/DL
SODIUM SERPL-SCNC: 140 MMOL/L

## 2024-08-26 ENCOUNTER — OUTPATIENT (OUTPATIENT)
Dept: OUTPATIENT SERVICES | Facility: HOSPITAL | Age: 24
LOS: 1 days | End: 2024-08-26

## 2024-08-26 ENCOUNTER — APPOINTMENT (OUTPATIENT)
Dept: MRI IMAGING | Facility: CLINIC | Age: 24
End: 2024-08-26
Payer: COMMERCIAL

## 2024-08-26 PROCEDURE — 70551 MRI BRAIN STEM W/O DYE: CPT | Mod: 26

## 2024-08-27 ENCOUNTER — TRANSCRIPTION ENCOUNTER (OUTPATIENT)
Age: 24
End: 2024-08-27

## 2024-09-03 ENCOUNTER — RX RENEWAL (OUTPATIENT)
Age: 24
End: 2024-09-03

## 2025-01-07 ENCOUNTER — APPOINTMENT (OUTPATIENT)
Dept: NEUROLOGY | Facility: CLINIC | Age: 25
End: 2025-01-07

## 2025-01-09 ENCOUNTER — APPOINTMENT (OUTPATIENT)
Dept: NEUROLOGY | Facility: CLINIC | Age: 25
End: 2025-01-09

## 2025-06-04 ENCOUNTER — RX RENEWAL (OUTPATIENT)
Age: 25
End: 2025-06-04

## 2025-06-06 ENCOUNTER — RX RENEWAL (OUTPATIENT)
Age: 25
End: 2025-06-06

## 2025-07-30 NOTE — ED PROVIDER NOTE - PMH
Seizure disorder
[FreeTextEntry1] : 14 points review of systems and is negative aside from what is mentioned in HPI 
[FreeTextEntry1] : 14 points review of systems and is negative aside from what is mentioned in HPI

## 2025-09-17 ENCOUNTER — RX RENEWAL (OUTPATIENT)
Age: 25
End: 2025-09-17